# Patient Record
Sex: FEMALE | Race: WHITE | NOT HISPANIC OR LATINO | Employment: FULL TIME | URBAN - METROPOLITAN AREA
[De-identification: names, ages, dates, MRNs, and addresses within clinical notes are randomized per-mention and may not be internally consistent; named-entity substitution may affect disease eponyms.]

---

## 2024-06-19 ENCOUNTER — OFFICE VISIT (OUTPATIENT)
Dept: INTERNAL MEDICINE | Facility: CLINIC | Age: 24
End: 2024-06-19
Payer: MEDICAID

## 2024-06-19 VITALS
SYSTOLIC BLOOD PRESSURE: 110 MMHG | WEIGHT: 132 LBS | HEART RATE: 61 BPM | RESPIRATION RATE: 18 BRPM | BODY MASS INDEX: 21.21 KG/M2 | DIASTOLIC BLOOD PRESSURE: 69 MMHG | HEIGHT: 66 IN | TEMPERATURE: 98 F

## 2024-06-19 DIAGNOSIS — F32.A ANXIETY AND DEPRESSION: Primary | ICD-10-CM

## 2024-06-19 DIAGNOSIS — F43.10 PTSD (POST-TRAUMATIC STRESS DISORDER): ICD-10-CM

## 2024-06-19 DIAGNOSIS — R51.9 NONINTRACTABLE HEADACHE, UNSPECIFIED CHRONICITY PATTERN, UNSPECIFIED HEADACHE TYPE: ICD-10-CM

## 2024-06-19 DIAGNOSIS — A63.0 HPV (HUMAN PAPILLOMA VIRUS) ANOGENITAL INFECTION: ICD-10-CM

## 2024-06-19 DIAGNOSIS — Z13.6 SCREENING FOR HYPERTENSION: ICD-10-CM

## 2024-06-19 DIAGNOSIS — F41.9 ANXIETY AND DEPRESSION: Primary | ICD-10-CM

## 2024-06-19 DIAGNOSIS — L60.0 INGROWN TOENAIL: ICD-10-CM

## 2024-06-19 DIAGNOSIS — Z00.00 WELL ADULT EXAM: ICD-10-CM

## 2024-06-19 PROBLEM — R53.83 FATIGUE: Status: ACTIVE | Noted: 2024-06-19

## 2024-06-19 PROCEDURE — 99205 OFFICE O/P NEW HI 60 MIN: CPT | Mod: PBBFAC | Performed by: NURSE PRACTITIONER

## 2024-06-19 PROCEDURE — 1160F RVW MEDS BY RX/DR IN RCRD: CPT | Mod: CPTII,,, | Performed by: NURSE PRACTITIONER

## 2024-06-19 PROCEDURE — 99205 OFFICE O/P NEW HI 60 MIN: CPT | Mod: S$PBB,,, | Performed by: NURSE PRACTITIONER

## 2024-06-19 PROCEDURE — 1159F MED LIST DOCD IN RCRD: CPT | Mod: CPTII,,, | Performed by: NURSE PRACTITIONER

## 2024-06-19 PROCEDURE — 3074F SYST BP LT 130 MM HG: CPT | Mod: CPTII,,, | Performed by: NURSE PRACTITIONER

## 2024-06-19 PROCEDURE — 3078F DIAST BP <80 MM HG: CPT | Mod: CPTII,,, | Performed by: NURSE PRACTITIONER

## 2024-06-19 PROCEDURE — 3008F BODY MASS INDEX DOCD: CPT | Mod: CPTII,,, | Performed by: NURSE PRACTITIONER

## 2024-06-19 RX ORDER — BUPROPION HYDROCHLORIDE 300 MG/1
300 TABLET ORAL DAILY
COMMUNITY

## 2024-06-19 RX ORDER — BUSPIRONE HYDROCHLORIDE 10 MG/1
10 TABLET ORAL 3 TIMES DAILY
COMMUNITY

## 2024-06-19 RX ORDER — BUTALBITAL, ACETAMINOPHEN AND CAFFEINE 50; 325; 40 MG/1; MG/1; MG/1
1 TABLET ORAL EVERY 4 HOURS PRN
Qty: 30 TABLET | Refills: 3 | Status: SHIPPED | OUTPATIENT
Start: 2024-06-19 | End: 2024-07-19

## 2024-06-19 RX ORDER — DESVENLAFAXINE SUCCINATE 50 MG/1
50 TABLET, EXTENDED RELEASE ORAL DAILY
COMMUNITY

## 2024-06-19 NOTE — PROGRESS NOTES
Patient ID: [id number]     Chief Complaint: Establish Care        HPI:     NAMITA Mark is a 23 y.o. female here today to establish care. Pt has hx of Anxiety/Depression, Fatigue, HA, PTSD, HPV. Pt followed by Dr Attila Estrada for mental health issues.         Immunizations:   Immunization History   Administered Date(s) Administered    COVID-19, MRNA, LN-S, PF (MODERNA FULL 0.5 ML DOSE) 08/12/2022        No past medical history on file.     History reviewed. No pertinent surgical history.    Review of patient's allergies indicates:  No Known Allergies    Current Outpatient Medications   Medication Instructions    buPROPion (WELLBUTRIN XL) 300 mg, Oral, Daily    busPIRone (BUSPAR) 10 mg, Oral, 3 times daily    butalbital-acetaminophen-caffeine -40 mg (FIORICET, ESGIC) -40 mg per tablet 1 tablet, Oral, Every 4 hours PRN    desvenlafaxine succinate (PRISTIQ) 50 mg, Oral, Daily    ibuprofen 800 mg, Mouth/Throat, Daily       Social History     Socioeconomic History    Marital status: Single   Tobacco Use    Smoking status: Never    Smokeless tobacco: Never     Social Determinants of Health     Financial Resource Strain: Medium Risk (6/19/2024)    Overall Financial Resource Strain (CARDIA)     Difficulty of Paying Living Expenses: Somewhat hard   Food Insecurity: Food Insecurity Present (6/19/2024)    Hunger Vital Sign     Worried About Running Out of Food in the Last Year: Often true     Ran Out of Food in the Last Year: Often true   Transportation Needs: No Transportation Needs (9/13/2023)    Received from [facility] within [facility] Subsidiaries and Affiliates    [facility]     Lack of Transportation (Medical): No     Lack of Transportation (Non-Medical): No   Physical Activity: Sufficiently Active (6/19/2024)    Exercise Vital Sign     Days of Exercise per Week: 7 days     Minutes of Exercise per Session: 60 min   Stress: Stress Concern Present  (6/19/2024)    [employer] - Occupational Stress Questionnaire     Feeling of Stress : Rather much   Housing Stability: High Risk (6/19/2024)    Housing Stability Vital Sign     Unable to Pay for Housing in the Last Year: Yes        Family History   Problem Relation Name Age of Onset    Diabetes Mother      Bipolar disorder Mother      Arthritis Maternal Grandmother          Patient Care Team:  Brittaney Esteban FNP as PCP - General (Family Medicine)     Subjective:     Review of Systems     See HPI for details    Constitutional: Denies Change in appetite. Denies Chills. Denies Fever. Denies Night sweats.  Eye: Denies Blurred vision. Denies Discharge. Denies Eye pain.  ENT: Denies Decreased hearing. Denies Sore throat. Denies Swollen glands.  Respiratory: Denies Cough. Denies Shortness of breath. Denies Shortness of breath with exertion. Denies Wheezing.  Cardiovascular: DeniesChest pain at rest. Denies Chest pain with exertion. Denies Irregular heartbeat. Denies Palpitations. Denies Edema.  Gastrointestinal: Denies Abdominal pain. DeniesDiarrhea. Denies Nausea. Denies Vomiting. Denies Hematemesis or Hematochezia.  Genitourinary: Denies Dysuria. Denies Urinary frequency. Denies Urinary urgency. Denies Blood in urine. Admits genital warts  Endocrine: Denies Cold intolerance. Denies Excessive thirst. Denies Heat intolerance. Denies Weight loss. Denies Weight gain.  Musculoskeletal: Denies Painful joints. Denies Weakness.  Integumentary: Denies Rash. Denies Itching. Denies Dry skin. Admits Bilat ingrown toenails.  Neurologic: Denies Dizziness. Denies Fainting. Denies Headache.  Psychiatric: Denies Depression. Denies Anxiety. Denies Suicidal/Homicidal ideations.    All Other ROS: Negative except as stated in HPI.       Objective:     Visit Vitals  /69 (BP Location: Right arm, Patient Position: Sitting, BP Method: Medium (Automatic))   Pulse 61   Temp 98.4 °F (36.9 °C) (Oral)   Resp  "18   Ht 5' 6" (1.676 m)   Wt 59.9 kg (132 lb)   BMI 21.31 kg/m²       Physical Exam    General: Alert and oriented, No acute distress.  Head: Normocephalic, Atraumatic.  Eye: Pupils are equal, round and reactive to light, Extraocular movements are intact, Sclera non-icteric.  Ears/Nose/Throat: Normal, Mucosa moist,Clear.  Neck/Thyroid: Supple, Non-tender, No carotid bruit, No lymphadenopathy, No JVD, Full range of motion.  Respiratory: Clear to auscultation bilaterally; No wheezes, rales or rhonchi,Non-labored respirations, Symmetrical chest wall expansion.  Cardiovascular: Regular rate and rhythm, S1/S2 normal, No murmurs, rubs or gallops.  Gastrointestinal: Soft, Non-tender, Non-distended, Normal bowel sounds, No palpable organomegaly.  Musculoskeletal: Normal range of motion.  Integumentary: + slight erythema noted to bilat inner great toes.   Extremities: No clubbing, cyanosis or edema  Neurologic: No focal deficits, Cranial Nerves II-XII are grossly intact, Motor strength normal upper and lower extremities, Sensory exam intact.  Psychiatric: Normal interaction, Coherent speech, Euthymic mood, Appropriate affect       Labs Reviewed:     Chemistry:  No results found for: "NA", "K", "CHLORIDE", "BUN", "CREATININE", "EGFRNORACEVR", "GLUCOSE", "CALCIUM", "ALKPHOS", "LABPROT", "ALBUMIN", "BILIDIR", "IBILI", "AST", "ALT", "MG", "PHOS", "[id number]"     No results found for: "HGBA1C", "MICROALBCREA"     Hematology:  No results found for: "WBC", "HGB", "HCT", "PLT"    Lipid Panel:  Lab Results   Component Value Date    CHOL 93 (L) 09/27/2023    HDL 66 09/27/2023    TRIG 30 09/27/2023        Urine:  No results found for: "COLORUA", "APPEARANCEUA", "SGUA", "PHUA", "PROTEINUA", "GLUCOSEUA", "KETONESUA", "BLOODUA", "NITRITESUA", "LEUKOCYTESUR", "RBCUA", "WBCUA", "BACTERIA", "SQEPUA", "HYALINECASTS", "CREATRANDUR", "PROTEINURINE", "UPROTCREA"     Assessment:       ICD-10-CM ICD-9-CM   1. Anxiety and depression  F41.9 " 300.00    F32.A 311   2. Nonintractable headache, unspecified chronicity pattern, unspecified headache type  R51.9 784.0   3. PTSD (post-traumatic stress disorder)  F43.10 309.81   4. Screening for hypertension  Z13.6 V81.1   5. Well adult exam  Z00.00 V70.0   6. Ingrown toenail  L60.0 703.0   7. HPV (human papilloma virus) anogenital infection  A63.0 079.4        Plan:     1. Anxiety and depression  Denies SI/HI.  Pt followed by Dr Attila Estrada for mental health issues. Keep appts. Cont Buproprion as prescribed per Dr Estrada.     2. Nonintractable headache, unspecified chronicity pattern, unspecified headache type  Pt has tried Ibuprofen and Tylenol with only minimal relief. Will try on Fioricet 1 tab po Q 4-6 hr prn HA.     3. PTSD (post-traumatic stress disorder)  Pt followed by Dr Attila Estrada for mental health issues. Keep appts. Cont Buproprion as prescribed per Dr Estrada.     4. Screening for hypertension  Labs in 3 months.    5. Well adult exam  Labs in 3 months.     6. HPV genital warts  Refer to GYN cl for eval and txmt.     7. Ingrown toenails  Refer to toenail clinic for eval and mgmt.     Follow up in about 3 weeks (around 7/10/2024) for with labs 1 week prior to appt. . In addition to their scheduled follow up, the patient has also been instructed to follow up on as needed basis.     No future appointments.     Brittaney Esteban, CAITLIN

## 2024-06-21 ENCOUNTER — LAB VISIT (OUTPATIENT)
Dept: LAB | Facility: HOSPITAL | Age: 24
End: 2024-06-21
Attending: NURSE PRACTITIONER
Payer: MEDICAID

## 2024-06-21 DIAGNOSIS — Z00.00 WELL ADULT EXAM: ICD-10-CM

## 2024-06-21 DIAGNOSIS — Z13.6 SCREENING FOR HYPERTENSION: ICD-10-CM

## 2024-06-21 LAB
ALBUMIN SERPL-MCNC: 4.4 G/DL (ref 3.5–5)
ALBUMIN/GLOB SERPL: 1.6 RATIO (ref 1.1–2)
ALP SERPL-CCNC: 58 UNIT/L (ref 40–150)
ALT SERPL-CCNC: 20 UNIT/L (ref 0–55)
ANION GAP SERPL CALC-SCNC: 5 MEQ/L
AST SERPL-CCNC: 25 UNIT/L (ref 5–34)
BACTERIA #/AREA URNS AUTO: ABNORMAL /HPF
BASOPHILS # BLD AUTO: 0.12 X10(3)/MCL
BASOPHILS NFR BLD AUTO: 1.8 %
BILIRUB SERPL-MCNC: 0.7 MG/DL
BILIRUB UR QL STRIP.AUTO: NEGATIVE
BUN SERPL-MCNC: 8.1 MG/DL (ref 7–18.7)
CALCIUM SERPL-MCNC: 9.3 MG/DL (ref 8.4–10.2)
CHLORIDE SERPL-SCNC: 105 MMOL/L (ref 98–107)
CHOLEST SERPL-MCNC: 96 MG/DL
CHOLEST/HDLC SERPL: 2 {RATIO} (ref 0–5)
CLARITY UR: CLEAR
CO2 SERPL-SCNC: 26 MMOL/L (ref 22–29)
COLOR UR AUTO: COLORLESS
CREAT SERPL-MCNC: 0.86 MG/DL (ref 0.55–1.02)
CREAT/UREA NIT SERPL: 9
EOSINOPHIL # BLD AUTO: 0.26 X10(3)/MCL (ref 0–0.9)
EOSINOPHIL NFR BLD AUTO: 4 %
ERYTHROCYTE [DISTWIDTH] IN BLOOD BY AUTOMATED COUNT: 11.5 % (ref 11.5–17)
EST. AVERAGE GLUCOSE BLD GHB EST-MCNC: 85.3 MG/DL
GFR SERPLBLD CREATININE-BSD FMLA CKD-EPI: >60 ML/MIN/1.73/M2
GLOBULIN SER-MCNC: 2.7 GM/DL (ref 2.4–3.5)
GLUCOSE SERPL-MCNC: 88 MG/DL (ref 74–100)
GLUCOSE UR QL STRIP: NORMAL
HAV IGM SERPL QL IA: NONREACTIVE
HBA1C MFR BLD: 4.6 %
HBV CORE IGM SERPL QL IA: NONREACTIVE
HBV SURFACE AG SERPL QL IA: NONREACTIVE
HCT VFR BLD AUTO: 41.8 % (ref 37–47)
HCV AB SERPL QL IA: NONREACTIVE
HDLC SERPL-MCNC: 62 MG/DL (ref 35–60)
HGB BLD-MCNC: 14.5 G/DL (ref 12–16)
HGB UR QL STRIP: NEGATIVE
HIV 1+2 AB+HIV1 P24 AG SERPL QL IA: NONREACTIVE
HYALINE CASTS #/AREA URNS LPF: ABNORMAL /LPF
IMM GRANULOCYTES # BLD AUTO: 0.03 X10(3)/MCL (ref 0–0.04)
IMM GRANULOCYTES NFR BLD AUTO: 0.5 %
KETONES UR QL STRIP: NEGATIVE
LDLC SERPL CALC-MCNC: 27 MG/DL (ref 50–140)
LEUKOCYTE ESTERASE UR QL STRIP: NEGATIVE
LYMPHOCYTES # BLD AUTO: 2.63 X10(3)/MCL (ref 0.6–4.6)
LYMPHOCYTES NFR BLD AUTO: 40.5 %
MCH RBC QN AUTO: 33 PG (ref 27–31)
MCHC RBC AUTO-ENTMCNC: 34.7 G/DL (ref 33–36)
MCV RBC AUTO: 95 FL (ref 80–94)
MONOCYTES # BLD AUTO: 0.49 X10(3)/MCL (ref 0.1–1.3)
MONOCYTES NFR BLD AUTO: 7.6 %
NEUTROPHILS # BLD AUTO: 2.96 X10(3)/MCL (ref 2.1–9.2)
NEUTROPHILS NFR BLD AUTO: 45.6 %
NITRITE UR QL STRIP: NEGATIVE
NRBC BLD AUTO-RTO: 0 %
PH UR STRIP: 7 [PH]
PLATELET # BLD AUTO: 316 X10(3)/MCL (ref 130–400)
PMV BLD AUTO: 8.8 FL (ref 7.4–10.4)
POTASSIUM SERPL-SCNC: 4.1 MMOL/L (ref 3.5–5.1)
PROT SERPL-MCNC: 7.1 GM/DL (ref 6.4–8.3)
PROT UR QL STRIP: NEGATIVE
RBC # BLD AUTO: 4.4 X10(6)/MCL (ref 4.2–5.4)
RBC #/AREA URNS AUTO: ABNORMAL /HPF
SODIUM SERPL-SCNC: 136 MMOL/L (ref 136–145)
SP GR UR STRIP.AUTO: ABNORMAL
SQUAMOUS #/AREA URNS LPF: ABNORMAL /HPF
TRIGL SERPL-MCNC: 34 MG/DL (ref 37–140)
TSH SERPL-ACNC: 1.47 UIU/ML (ref 0.35–4.94)
UROBILINOGEN UR STRIP-ACNC: NORMAL
VLDLC SERPL CALC-MCNC: 7 MG/DL
WBC # BLD AUTO: 6.49 X10(3)/MCL (ref 4.5–11.5)
WBC #/AREA URNS AUTO: ABNORMAL /HPF

## 2024-06-21 PROCEDURE — 80074 ACUTE HEPATITIS PANEL: CPT

## 2024-06-21 PROCEDURE — 80061 LIPID PANEL: CPT

## 2024-06-21 PROCEDURE — 80053 COMPREHEN METABOLIC PANEL: CPT

## 2024-06-21 PROCEDURE — 83036 HEMOGLOBIN GLYCOSYLATED A1C: CPT

## 2024-06-21 PROCEDURE — 36415 COLL VENOUS BLD VENIPUNCTURE: CPT

## 2024-06-21 PROCEDURE — 87389 HIV-1 AG W/HIV-1&-2 AB AG IA: CPT

## 2024-06-21 PROCEDURE — 85025 COMPLETE CBC W/AUTO DIFF WBC: CPT

## 2024-06-21 PROCEDURE — 84443 ASSAY THYROID STIM HORMONE: CPT

## 2024-06-21 PROCEDURE — 81001 URINALYSIS AUTO W/SCOPE: CPT

## 2024-07-01 ENCOUNTER — HOSPITAL ENCOUNTER (EMERGENCY)
Facility: HOSPITAL | Age: 24
Discharge: HOME | End: 2024-07-01
Attending: INTERNAL MEDICINE
Payer: MEDICAID

## 2024-07-01 VITALS
TEMPERATURE: 98 F | SYSTOLIC BLOOD PRESSURE: 118 MMHG | OXYGEN SATURATION: 100 % | RESPIRATION RATE: 16 BRPM | WEIGHT: 127.31 LBS | HEIGHT: 66 IN | HEART RATE: 81 BPM | DIASTOLIC BLOOD PRESSURE: 70 MMHG | BODY MASS INDEX: 20.46 KG/M2

## 2024-07-01 DIAGNOSIS — R21 RASH AND NONSPECIFIC SKIN ERUPTION: Primary | ICD-10-CM

## 2024-07-01 LAB
B-HCG UR QL: NEGATIVE
CTP QC/QA: YES

## 2024-07-01 PROCEDURE — 99284 EMERGENCY DEPT VISIT MOD MDM: CPT

## 2024-07-01 PROCEDURE — 81025 URINE PREGNANCY TEST: CPT | Performed by: NURSE PRACTITIONER

## 2024-07-01 RX ORDER — HYDROXYZINE PAMOATE 25 MG/1
25 CAPSULE ORAL EVERY 6 HOURS PRN
Qty: 20 CAPSULE | Refills: 0 | Status: SHIPPED | OUTPATIENT
Start: 2024-07-01

## 2024-07-01 RX ORDER — PREDNISONE 20 MG/1
40 TABLET ORAL DAILY
Qty: 10 TABLET | Refills: 0 | Status: SHIPPED | OUTPATIENT
Start: 2024-07-01 | End: 2024-07-06

## 2024-07-01 NOTE — Clinical Note
"Sherron Castellanos" Deedee was seen and treated in our emergency department on 7/1/2024.  She may return to work on 07/06/2024.       If you have any questions or concerns, please don't hesitate to call.      Tejas MARSH    "

## 2024-07-01 NOTE — ED PROVIDER NOTES
Encounter Date: 7/1/2024       History     Chief Complaint   Patient presents with    Rash     Presents from home with c/o generalized rash on arms, face, chest, abd, back, and starting to spread onto legs since last night. Red spotty rash noted. No c/o itching, burning, or pain from rah. No reported change in lotions, detergents, or medications.     The patient presents with rash.  The onset was 1 day ago.  The course/duration of symptoms is constant.  Location: generalized. The character of symptoms is mild itching.  Radiating symptom: none.  The degree of symptoms is minimal.  Risk factors consist of none  Prior episodes: none.  Associated symptoms: denies fever, denies chills and denies shortness of breath.             Review of patient's allergies indicates:  No Known Allergies  History reviewed. No pertinent past medical history.  History reviewed. No pertinent surgical history.  Family History   Problem Relation Name Age of Onset    Diabetes Mother      Bipolar disorder Mother      Arthritis Maternal Grandmother       Social History     Tobacco Use    Smoking status: Never    Smokeless tobacco: Never   Substance Use Topics    Alcohol use: Yes     Comment: occasionally    Drug use: Yes     Types: Marijuana     Review of Systems   Constitutional:  Negative for fever.   HENT:  Negative for sore throat.    Respiratory:  Negative for shortness of breath.    Cardiovascular:  Negative for chest pain.   Gastrointestinal:  Negative for nausea.   Genitourinary:  Negative for dysuria.   Musculoskeletal:  Negative for back pain.   Skin:  Positive for rash.   Neurological:  Negative for weakness.   Hematological:  Does not bruise/bleed easily.   All other systems reviewed and are negative.      Physical Exam     Initial Vitals [07/01/24 0753]   BP Pulse Resp Temp SpO2   119/68 80 16 98.4 °F (36.9 °C) 98 %      MAP       --         Physical Exam    Nursing note and vitals reviewed.  Constitutional: She appears  well-developed and well-nourished.   HENT:   Head: Normocephalic and atraumatic.   Right Ear: Tympanic membrane normal.   Left Ear: Tympanic membrane normal.   Nose: Nose normal.   Mouth/Throat: Uvula is midline, oropharynx is clear and moist and mucous membranes are normal.   Neck: Neck supple.   Normal range of motion.  Cardiovascular:  Normal rate, regular rhythm, normal heart sounds and intact distal pulses.           Pulmonary/Chest: Effort normal and breath sounds normal. She has no decreased breath sounds.   Abdominal: Abdomen is soft and flat. Bowel sounds are normal. There is no abdominal tenderness.   Musculoskeletal:         General: Normal range of motion.      Cervical back: Normal range of motion and neck supple.     Neurological: She is alert. She has normal strength.   Skin: Skin is warm and dry.   Generalized maculopapular rash   Psychiatric: She has a normal mood and affect.         ED Course   Procedures  Labs Reviewed   POCT URINE PREGNANCY          Imaging Results    None          Medications - No data to display  Medical Decision Making  The patient presents with rash.  The onset was 1 day ago.  The course/duration of symptoms is constant.  Location: generalized. The character of symptoms is mild itching.  Radiating symptom: none.  The degree of symptoms is minimal.  Risk factors consist of none  Prior episodes: none.  Associated symptoms: denies fever, denies chills and denies shortness of breath.       7:58 AM DISPOSITION: The patient is resting comfortably in no acute distress.  She is hemodynamically stable and is without objective evidence for acute process requiring urgent intervention or hospitalization. I provided counseling to patient with regard to condition, the treatment plan, specific conditions for return, and the importance of follow up. Detailed written and verbal instructions provided to patient and she expressed a verbal understanding of the discharge instructions and overall  management plan. Reiterated the importance of medication administration and safety and advised patient to follow up with primary care provider in 3-5 days or sooner if needed.  Answered questions at this time. The patient is stable for discharge.       Amount and/or Complexity of Data Reviewed  Labs: ordered.      Additional MDM:   Differential Diagnosis:   At this time differential diagnosis is but not limited to contact dermatitis, eczema, tinea              ED Course as of 07/01/24 0758   Mon Jul 01, 2024   0756 Given strict ED return precautions. I have spoken with the patient and/or caregivers. I have explained the patient's condition, diagnoses and treatment plan based on the information available to me at this time. I have answered the patient's and/or caregiver's questions and addressed any concerns. The patient and/or caregivers have as good an understanding of the patient's diagnosis, condition and treatment plan as can be expected at this point. The vital signs have been stable. The patient's condition is stable and appropriate for discharge from the emergency department.      The patient will pursue further outpatient evaluation with the primary care physician or other designated or consulting physician as outlined in the discharge instructions. The patient and/or caregivers are agreeable to this plan of care and follow-up instructions have been explained in detail. The patient and/or caregivers have received these instructions in written format and have expressed an understanding of the discharge instructions. The patient and/or caregivers are aware that any significant change in condition or worsening of symptoms should prompt an immediate return to this or the closest emergency department or a call to 911.      [RB]      ED Course User Index  [RB] Kristian Reese ACNP                           Clinical Impression:  Final diagnoses:  [R21] Rash and nonspecific skin eruption (Primary)          ED  Disposition Condition    Discharge Stable          ED Prescriptions       Medication Sig Dispense Start Date End Date Auth. Provider    hydrOXYzine pamoate (VISTARIL) 25 MG Cap Take 1 capsule (25 mg total) by mouth every 6 (six) hours as needed (itching). May cause drowsiness 20 capsule 7/1/2024 -- Kristian Reese ACNP    predniSONE (DELTASONE) 20 MG tablet Take 2 tablets (40 mg total) by mouth once daily. for 5 days 10 tablet 7/1/2024 7/6/2024 Kristian Reese ACNP          Follow-up Information       Follow up With Specialties Details Why Contact Info    Brittaney Esteban, FNP Family Medicine In 3 days  [address] [facility] [address]  [phone]      [facility] Emergency Medicine  If symptoms worsen [address] [facility] [address] [phone]             Kristian Reese ACNP  07/01/24 0754

## 2024-07-15 ENCOUNTER — OFFICE VISIT (OUTPATIENT)
Dept: FAMILY MEDICINE | Facility: CLINIC | Age: 24
End: 2024-07-15
Payer: MEDICAID

## 2024-07-15 VITALS
RESPIRATION RATE: 18 BRPM | SYSTOLIC BLOOD PRESSURE: 113 MMHG | DIASTOLIC BLOOD PRESSURE: 69 MMHG | BODY MASS INDEX: 20.95 KG/M2 | HEART RATE: 70 BPM | WEIGHT: 130.38 LBS | HEIGHT: 66 IN | OXYGEN SATURATION: 99 % | TEMPERATURE: 99 F

## 2024-07-15 DIAGNOSIS — L60.0 INGROWN TOENAIL: ICD-10-CM

## 2024-07-15 PROCEDURE — 99213 OFFICE O/P EST LOW 20 MIN: CPT | Mod: PBBFAC

## 2024-07-15 RX ORDER — LIDOCAINE HYDROCHLORIDE 10 MG/ML
10 INJECTION, SOLUTION EPIDURAL; INFILTRATION; INTRACAUDAL; PERINEURAL
Status: ACTIVE | OUTPATIENT
Start: 2024-07-15

## 2024-07-15 NOTE — PROGRESS NOTES
I have seen the patient, reviewed the resident's history and physical, assessment, plan, and progress note. I have personally interviewed and examined the patient at bedside and: agree with the findings.     Hima Garcia MD  [facility]

## 2024-07-15 NOTE — PROGRESS NOTES
"[facility] Note    CHIEF COMPLAINT:  Ingrown toenail      HISTORY OF  PRESENT ILLNESS:  Sherron Mark is a 23 y.o. female who presents to  [facility] as a referral for ingrown toenails. Patient states she's had issues with both great toenails for almost 2 years now. She is usually on her feet all day as she works at a bakery and a nursery. They are painful and grow into her skin. The R great toe will sometimes become inflamed and drain a pus-like fluid. She has never been on any antibiotics or had any other treatments in the past. Denies any recent fever, bleeding, drainage. Would like to discuss treatment options today.      REVIEW OF SYSTEMS:  Per HPI.    PHYSICAL EXAMINATION:  Blood pressure 113/69, pulse 70, temperature 98.6 °F (37 °C), resp. rate 18, height 5' 5.75" (1.67 m), weight 59.1 kg (130 lb 6.4 oz), last menstrual period 07/08/2024, SpO2 99%.    General:  VSS. No acute distress.   Skin: Both great toenail beds are grown into the skin (see photo below).          ASSESSMENT/PLAN:  Ingrown toenails  - Discussed options for removal today  - Patient would like to defer removal to a Friday when she will be off from work  - F/u in 2 weeks on a Friday     Elif Winkler MD    [facility] Family Medicine Resident, HO-1        "

## 2024-07-15 NOTE — LETTER
July 15, 2024    Sherron Mark  [address] [place] [address]             [facility]  Family Medicine  [address] [address]  Phone: [phone]   July 15, 2024     Patient: Sherron Mark   YOB: 2000   Date of Visit: 7/15/2024       To Whom it May Concern:    Sherron Mark was seen in my clinic on 7/15/2024. She may return to work on [id number] .    Please excuse her from any classes or work missed.    If you have any questions or concerns, please don't hesitate to call.    Sincerely,         Mando Luke LPN

## 2024-07-17 ENCOUNTER — TELEPHONE (OUTPATIENT)
Dept: INTERNAL MEDICINE | Facility: CLINIC | Age: 24
End: 2024-07-17

## 2024-07-17 ENCOUNTER — OFFICE VISIT (OUTPATIENT)
Dept: INTERNAL MEDICINE | Facility: CLINIC | Age: 24
End: 2024-07-17
Payer: MEDICAID

## 2024-07-17 VITALS
HEART RATE: 65 BPM | RESPIRATION RATE: 18 BRPM | HEIGHT: 65 IN | WEIGHT: 130 LBS | SYSTOLIC BLOOD PRESSURE: 101 MMHG | BODY MASS INDEX: 21.66 KG/M2 | TEMPERATURE: 99 F | DIASTOLIC BLOOD PRESSURE: 67 MMHG

## 2024-07-17 DIAGNOSIS — Z00.00 WELL ADULT EXAM: ICD-10-CM

## 2024-07-17 DIAGNOSIS — F41.9 ANXIETY AND DEPRESSION: Primary | ICD-10-CM

## 2024-07-17 DIAGNOSIS — F43.10 PTSD (POST-TRAUMATIC STRESS DISORDER): ICD-10-CM

## 2024-07-17 DIAGNOSIS — F41.9 ANXIETY: ICD-10-CM

## 2024-07-17 DIAGNOSIS — R51.9 NONINTRACTABLE HEADACHE, UNSPECIFIED CHRONICITY PATTERN, UNSPECIFIED HEADACHE TYPE: ICD-10-CM

## 2024-07-17 DIAGNOSIS — F32.A ANXIETY AND DEPRESSION: Primary | ICD-10-CM

## 2024-07-17 DIAGNOSIS — Z13.6 SCREENING FOR HYPERTENSION: ICD-10-CM

## 2024-07-17 PROCEDURE — 99214 OFFICE O/P EST MOD 30 MIN: CPT | Mod: S$PBB,,, | Performed by: NURSE PRACTITIONER

## 2024-07-17 PROCEDURE — 1160F RVW MEDS BY RX/DR IN RCRD: CPT | Mod: CPTII,,, | Performed by: NURSE PRACTITIONER

## 2024-07-17 PROCEDURE — 3078F DIAST BP <80 MM HG: CPT | Mod: CPTII,,, | Performed by: NURSE PRACTITIONER

## 2024-07-17 PROCEDURE — 99214 OFFICE O/P EST MOD 30 MIN: CPT | Mod: PBBFAC | Performed by: NURSE PRACTITIONER

## 2024-07-17 PROCEDURE — 3074F SYST BP LT 130 MM HG: CPT | Mod: CPTII,,, | Performed by: NURSE PRACTITIONER

## 2024-07-17 PROCEDURE — 3008F BODY MASS INDEX DOCD: CPT | Mod: CPTII,,, | Performed by: NURSE PRACTITIONER

## 2024-07-17 PROCEDURE — 1159F MED LIST DOCD IN RCRD: CPT | Mod: CPTII,,, | Performed by: NURSE PRACTITIONER

## 2024-07-17 PROCEDURE — 3044F HG A1C LEVEL LT 7.0%: CPT | Mod: CPTII,,, | Performed by: NURSE PRACTITIONER

## 2024-07-17 RX ORDER — ALPRAZOLAM 0.5 MG/1
0.5 TABLET ORAL ONCE
Qty: 1 TABLET | Refills: 0 | Status: SHIPPED | OUTPATIENT
Start: 2024-07-17 | End: 2024-07-17

## 2024-07-17 RX ORDER — TOPIRAMATE 25 MG/1
25 TABLET ORAL 2 TIMES DAILY
Qty: 60 TABLET | Refills: 3 | Status: SHIPPED | OUTPATIENT
Start: 2024-07-17 | End: 2025-07-17

## 2024-07-17 NOTE — PROGRESS NOTES
Patient ID: [id number]     Chief Complaint: Results        HPI:     NAMITA Mark is a 23 y.o. female here today for a follow up.         Optometrist:  Dentist:  Breast Cancer Screening:  [unfilled]   Cervical Cancer Screening:     Colorectal Cancer Screening:  Diabetic Eye Exam:  Diabetic Foot Exam:  Lung Cancer Screening:    Prostate Cancer Screening:  AAA Screening:  Osteoporosis Screening:  Medicare Wellness:   Immunizations:   Immunization History   Administered Date(s) Administered    COVID-19, MRNA, LN-S, PF (MODERNA FULL 0.5 ML DOSE) 08/12/2022        -------------------------------------    Abnormal Pap smear of cervix        History reviewed. No pertinent surgical history.    Review of patient's allergies indicates:  No Known Allergies    Current Outpatient Medications   Medication Instructions    buPROPion (WELLBUTRIN XL) 300 mg, Oral, Daily    busPIRone (BUSPAR) 10 mg, Oral, 3 times daily    butalbital-acetaminophen-caffeine -40 mg (FIORICET, ESGIC) -40 mg per tablet 1 tablet, Oral, Every 4 hours PRN    desvenlafaxine succinate (PRISTIQ) 50 mg, Oral, Daily    hydrOXYzine pamoate (VISTARIL) 25 mg, Oral, Every 6 hours PRN, May cause drowsiness    ibuprofen 800 mg, Mouth/Throat, Daily       Social History     Socioeconomic History    Marital status: Single   Tobacco Use    Smoking status: Never    Smokeless tobacco: Never   Substance and Sexual Activity    Alcohol use: Yes     Comment: occasionally    Drug use: Yes     Types: Marijuana    Sexual activity: Yes     Partners: Male     Social Determinants of Health     Financial Resource Strain: Medium Risk (6/19/2024)    Overall Financial Resource Strain (CARDIA)     Difficulty of Paying Living Expenses: Somewhat hard   Food Insecurity: Food Insecurity Present (6/19/2024)    Hunger Vital Sign     Worried About Running Out of Food in the Last Year: Often true     Ran Out of Food in the Last Year: Often true   Transportation  Needs: No Transportation Needs (9/13/2023)    Received from [facility] and Its Subsidiaries and Affiliates    PRAPARE - Transportation     Lack of Transportation (Medical): No     Lack of Transportation (Non-Medical): No   Physical Activity: Sufficiently Active (6/19/2024)    Exercise Vital Sign     Days of Exercise per Week: 7 days     Minutes of Exercise per Session: 60 min   Stress: Stress Concern Present (6/19/2024)    [employer] [place] [employer] - Occupational Stress Questionnaire     Feeling of Stress : Rather much   Housing Stability: High Risk (6/19/2024)    Housing Stability Vital Sign     Unable to Pay for Housing in the Last Year: Yes        Family History   Problem Relation Name Age of Onset    Diabetes Mother      Bipolar disorder Mother      Arthritis Maternal Grandmother          Patient Care Team:  Brittaney Esteban FNP as PCP - General (Family Medicine)     Subjective:     Review of Systems     See HPI for details    Constitutional: Denies Change in appetite. Denies Chills. Denies Fever. Denies Night sweats.  Eye: Denies Blurred vision. Denies Discharge. Denies Eye pain.  ENT: Denies Decreased hearing. Denies Sore throat. Denies Swollen glands.  Respiratory: Denies Cough. Denies Shortness of breath. Denies Shortness of breath with exertion. Denies Wheezing.  Cardiovascular: DeniesChest pain at rest. Denies Chest pain with exertion. Denies Irregular heartbeat. Denies Palpitations. Denies Edema.  Gastrointestinal: Denies Abdominal pain. DeniesDiarrhea. Denies Nausea. Denies Vomiting. Denies Hematemesis or Hematochezia.  Genitourinary: Denies Dysuria. Denies Urinary frequency. Denies Urinary urgency. Denies Blood in urine.  Endocrine: Denies Cold intolerance. Denies Excessive thirst. Denies Heat intolerance. Denies Weight loss. Denies Weight gain.  Musculoskeletal: Denies Painful joints. Denies Weakness.  Integumentary: Denies Rash. Denies Itching.  "Denies Dry skin.  Neurologic: Denies Dizziness. Denies Fainting. AdmitsHeadache.  Psychiatric: Denies Depression. Denies Anxiety. Denies Suicidal/Homicidal ideations.    All Other ROS: Negative except as stated in HPI.       Objective:     Visit Vitals  /67 (BP Location: Right arm, Patient Position: Sitting, BP Method: Large (Automatic))   Pulse 65   Temp 98.8 °F (37.1 °C) (Oral)   Resp 18   Ht 5' 5" (1.651 m)   Wt 59 kg (130 lb)   LMP 07/08/2024   BMI 21.63 kg/m²       Physical Exam    General: Alert and oriented, No acute distress.  Head: Normocephalic, Atraumatic.  Eye: Pupils are equal, round and reactive to light, Extraocular movements are intact, Sclera non-icteric.  Ears/Nose/Throat: Normal, Mucosa moist,Clear.  Neck/Thyroid: Supple, Non-tender, No carotid bruit, No lymphadenopathy, No JVD, Full range of motion.  Respiratory: Clear to auscultation bilaterally; No wheezes, rales or rhonchi,Non-labored respirations, Symmetrical chest wall expansion.  Cardiovascular: Regular rate and rhythm, S1/S2 normal, No murmurs, rubs or gallops.  Gastrointestinal: Soft, Non-tender, Non-distended, Normal bowel sounds, No palpable organomegaly.  Musculoskeletal: Normal range of motion.  Integumentary: Warm, Dry, Intact, No suspicious lesions or rashes.  Extremities: No clubbing, cyanosis or edema  Neurologic: No focal deficits, Cranial Nerves II-XII are grossly intact, Motor strength normal upper and lower extremities, Sensory exam intact.  Psychiatric: Normal interaction, Coherent speech, Euthymic mood, Appropriate affect       Labs Reviewed:     Chemistry:  Lab Results   Component Value Date     06/21/2024    K 4.1 06/21/2024    BUN 8.1 06/21/2024    CREATININE 0.86 06/21/2024    EGFRNORACEVR >60 06/21/2024    GLUCOSE 88 06/21/2024    CALCIUM 9.3 06/21/2024    ALKPHOS 58 06/21/2024    LABPROT 7.1 06/21/2024    ALBUMIN 4.4 06/21/2024    AST 25 06/21/2024    ALT 20 06/21/2024        Lab Results   Component Value " Date    HGBA1C 4.6 06/21/2024        Hematology:  Lab Results   Component Value Date    WBC 6.49 06/21/2024    HGB 14.5 06/21/2024    HCT 41.8 06/21/2024     06/21/2024       Lipid Panel:  Lab Results   Component Value Date    CHOL 96 06/21/2024    CHOL 93 (L) 09/27/2023    HDL 62 (H) 06/21/2024    HDL 66 09/27/2023    LDL 27.00 (L) 06/21/2024    TRIG 34 (L) 06/21/2024    TRIG 30 09/27/2023    TOTALCHOLEST 2 06/21/2024        Urine:  Lab Results   Component Value Date    APPEARANCEUA Clear 06/21/2024    SGUA  06/21/2024      Comment:      1.002      PROTEINUA Negative 06/21/2024    KETONESUA Negative 06/21/2024    LEUKOCYTESUR Negative 06/21/2024    RBCUA None Seen 06/21/2024    WBCUA 0-5 06/21/2024    BACTERIA None Seen 06/21/2024    SQEPUA Trace (A) 06/21/2024    HYALINECASTS None Seen 06/21/2024        Assessment:       ICD-10-CM ICD-9-CM   1. Anxiety and depression  F41.9 300.00    F32.A 311   2. Nonintractable headache, unspecified chronicity pattern, unspecified headache type  R51.9 784.0   3. PTSD (post-traumatic stress disorder)  F43.10 309.81   4. Screening for hypertension  Z13.6 V81.1   5. Well adult exam  Z00.00 V70.0        Plan:     1. Anxiety and depression  Denies SI/HI. Pt followed by Dr Attila Estrada for mental health issues. Keep appts. Cont Buproprion as prescribed per Dr Estrada.  Pt states she is having her toenails removed and is anxious for procedure and asked for RX to take just that day to help with anxiety. RX Xanax 0.25 take 1 tab po 30-45 minutes prior to procedure. Informed to make sure she has someone drive her. Pt verbalized understanding.     2. Nonintractable headache, unspecified chronicity pattern, unspecified headache type  States  Fioricet is not helping control HA. D/C Fioricet. RX Topiramate  25 mg take 1 tab po Q hs X 1 week then increase to 1 tab po BID.     3. PTSD (post-traumatic stress disorder)  Pt followed by Dr Attila Estrada for mental health issues. Keep appts.  Cont Buproprion as prescribed per Dr Estrada.     4. Screening for hypertension  Labs in 6 months.     5. Well adult exam  Labs in 6 months. Pt referred to GYN cl 6-19-24 Keep appt.          Follow up in about 6 months (around 1/17/2025) for with labs 1 week prior to appt. . In addition to their scheduled follow up, the patient has also been instructed to follow up on as needed basis.     Future Appointments   Date Time Provider Department Center   7/19/2024 10:00 AM SCHEDULE,  MINOR SURGERY [facility] RES [place]    10/16/2024  1:30 PM Ruthy Patel FNP [facility] GYN [place]        CAITLIN Langley

## 2024-07-19 ENCOUNTER — OFFICE VISIT (OUTPATIENT)
Dept: FAMILY MEDICINE | Facility: CLINIC | Age: 24
End: 2024-07-19
Payer: MEDICAID

## 2024-07-19 VITALS
WEIGHT: 130 LBS | BODY MASS INDEX: 21.66 KG/M2 | RESPIRATION RATE: 18 BRPM | HEIGHT: 65 IN | OXYGEN SATURATION: 99 % | TEMPERATURE: 99 F

## 2024-07-19 DIAGNOSIS — L60.0 INGROWN TOENAIL: Primary | ICD-10-CM

## 2024-07-19 PROCEDURE — 11730 AVULSION NAIL PLATE SIMPLE 1: CPT | Mod: PBBFAC

## 2024-07-19 PROCEDURE — 99213 OFFICE O/P EST LOW 20 MIN: CPT | Mod: PBBFAC,25

## 2024-07-19 RX ORDER — LIDOCAINE HYDROCHLORIDE 10 MG/ML
10 INJECTION, SOLUTION EPIDURAL; INFILTRATION; INTRACAUDAL; PERINEURAL
Status: COMPLETED | OUTPATIENT
Start: 2024-07-19 | End: 2024-07-19

## 2024-07-19 RX ADMIN — LIDOCAINE HYDROCHLORIDE 100 MG: 10 INJECTION, SOLUTION EPIDURAL; INFILTRATION; INTRACAUDAL; PERINEURAL at 11:07

## 2024-07-19 NOTE — PROGRESS NOTES
"[facility] Minor Procedure Clinic Note    CHIEF COMPLAINT:  Ingrown toe nail          HISTORY OF  PRESENT ILLNESS:  Sherron Mark is a 23 y.o. female who presents to  Minor Procedure Clinic for toenail removal. Was evaluated at last clinic visit but rescheduled for removal today due to patient's work schedule. Patient endorses she would like to proceed with removal today.      REVIEW OF SYSTEMS:  Per HPI.    PHYSICAL EXAMINATION:  Temperature 98.6 °F (37 °C), resp. rate 18, height 5' 4.96" (1.65 m), weight 59 kg (130 lb), last menstrual period 07/08/2024, SpO2 99%.    General:  VSS. No acute distress.   Skin:  Both great toenail beds are grown into the skin (see photo below).           ASSESSMENT/PLAN:  Ingrown toe nail   - R great toenail bed removed today, see procedure note below   - Post-care instructions given   - F/u in one month for recheck of R great toe and removal of L great toenail bed     PROCEDURE NOTE:  Procedure: Removal of R great toenail   Performed by: Elif Winkler MD  Supervised by: Hima Garcia MD   Consent: signed consent obtained after discussion of risks, benefits, and alternative treatments. Time out completed  Description: Prepped with Cloraprep, anesthesia with lidocaine 1%.  Periosteal elevator used to avulse nailbed.  Hemostats use to remove nail.  Dressed with petroleum jelly gauze and Koban.    EBL < 5 ml  Routine post-toenail removal instructions given   The patient tolerated the procedure well with no complications.       Elif Winkler MD    [facility] Family Medicine Resident, HO-1    "

## 2024-07-19 NOTE — LETTER
July 19, 2024    Sherron Mark  [address] [address]             [facility]  Family Medicine  [address] [address]  Phone: [phone]   July 19, 2024     Patient: Sherron Mark   YOB: 2000   Date of Visit: 7/19/2024       To Whom it May Concern:    Sherron Mark was seen in my clinic on 7/19/2024. She may return to work on [id number] .    Please excuse her from any classes or work missed.    If you have any questions or concerns, please don't hesitate to call.    Sincerely,         Mando Luke LPN

## 2024-08-30 ENCOUNTER — OFFICE VISIT (OUTPATIENT)
Dept: INTERNAL MEDICINE | Facility: CLINIC | Age: 24
End: 2024-08-30
Payer: MEDICAID

## 2024-08-30 ENCOUNTER — PATIENT MESSAGE (OUTPATIENT)
Dept: INTERNAL MEDICINE | Facility: CLINIC | Age: 24
End: 2024-08-30

## 2024-08-30 VITALS
BODY MASS INDEX: 22.02 KG/M2 | RESPIRATION RATE: 18 BRPM | HEART RATE: 92 BPM | HEIGHT: 64 IN | SYSTOLIC BLOOD PRESSURE: 115 MMHG | TEMPERATURE: 99 F | WEIGHT: 129 LBS | DIASTOLIC BLOOD PRESSURE: 72 MMHG

## 2024-08-30 DIAGNOSIS — R51.9 NONINTRACTABLE HEADACHE, UNSPECIFIED CHRONICITY PATTERN, UNSPECIFIED HEADACHE TYPE: Primary | ICD-10-CM

## 2024-08-30 PROCEDURE — 99214 OFFICE O/P EST MOD 30 MIN: CPT | Mod: PBBFAC | Performed by: NURSE PRACTITIONER

## 2024-08-30 RX ORDER — DESVENLAFAXINE 100 MG/1
100 TABLET, EXTENDED RELEASE ORAL DAILY
COMMUNITY
Start: 2024-08-01

## 2024-08-30 NOTE — PROGRESS NOTES
Patient ID: [id number]     Chief Complaint: Headache (2 - 3 times a week/referral to chiropracter)        HPI:     NAMITA Mark is a 24 y.o. female here today for c/o HA 2-3 times a week.         Immunizations:   Immunization History   Administered Date(s) Administered    COVID-19, MRNA, LN-S, PF (MODERNA FULL 0.5 ML DOSE) 08/12/2022        -------------------------------------    Abnormal Pap smear of cervix        History reviewed. No pertinent surgical history.    Review of patient's allergies indicates:  No Known Allergies    Current Outpatient Medications   Medication Instructions    buPROPion (WELLBUTRIN XL) 300 mg, Oral, Daily    busPIRone (BUSPAR) 10 mg, Oral, 3 times daily    desvenlafaxine succinate (PRISTIQ) 100 mg, Oral, Daily    ibuprofen 800 mg, Mouth/Throat, Daily    topiramate (TOPAMAX) 25 mg, Oral, 2 times daily, Take 1 tab po Q hs X 1 week then increase to 1 tab po BID.       Social History     Socioeconomic History    Marital status: Single   Tobacco Use    Smoking status: Never    Smokeless tobacco: Never   Substance and Sexual Activity    Alcohol use: Yes     Comment: occasionally    Drug use: Yes     Types: Marijuana    Sexual activity: Yes     Partners: Male     Social Determinants of Health     Financial Resource Strain: Medium Risk (6/19/2024)    Overall Financial Resource Strain (CARDIA)     Difficulty of Paying Living Expenses: Somewhat hard   Food Insecurity: Food Insecurity Present (6/19/2024)    Hunger Vital Sign     Worried About Running Out of Food in the Last Year: Often true     Ran Out of Food in the Last Year: Often true   Transportation Needs: No Transportation Needs (9/13/2023)    Received from [facility] Subsidi[facility] and Affiliates    PRAPARE - Transportation     Lack of Transportation (Medical): No     Lack of Transportation (Non-Medical): No   Physical Activity: Sufficiently Active (6/19/2024)    Exercise Vital  Sign     Days of Exercise per Week: 7 days     Minutes of Exercise per Session: 60 min   Stress: Stress Concern Present (6/19/2024)    [employer] [place] [employer] - Occupational Stress Questionnaire     Feeling of Stress : Rather much   Housing Stability: High Risk (6/19/2024)    Housing Stability Vital Sign     Unable to Pay for Housing in the Last Year: Yes        Family History   Problem Relation Name Age of Onset    Diabetes Mother      Bipolar disorder Mother      Arthritis Maternal Grandmother          Patient Care Team:  Brittaney Esteban FNP as PCP - General (Family Medicine)     Subjective:     Review of Systems     See HPI for details    Constitutional: Denies Change in appetite. Denies Chills. Denies Fever. Denies Night sweats.  Eye: Denies Blurred vision. Denies Discharge. Denies Eye pain.  ENT: Denies Decreased hearing. Denies Sore throat. Denies Swollen glands.  Respiratory: Denies Cough. Denies Shortness of breath. Denies Shortness of breath with exertion. Denies Wheezing.  Cardiovascular: DeniesChest pain at rest. Denies Chest pain with exertion. Denies Irregular heartbeat. Denies Palpitations. Denies Edema.  Gastrointestinal: Denies Abdominal pain. DeniesDiarrhea. Denies Nausea. Denies Vomiting. Denies Hematemesis or Hematochezia.  Genitourinary: Denies Dysuria. Denies Urinary frequency. Denies Urinary urgency. Denies Blood in urine.  Endocrine: Denies Cold intolerance. Denies Excessive thirst. Denies Heat intolerance. Denies Weight loss. Denies Weight gain.  Musculoskeletal: Denies Painful joints. Denies Weakness.  Integumentary: Denies Rash. Denies Itching. Denies Dry skin.  Neurologic: Denies Dizziness. Denies Fainting. AdmitsHeadache.  Psychiatric: Denies Depression. Denies Anxiety. Denies Suicidal/Homicidal ideations.    All Other ROS: Negative except as stated in HPI.       Objective:     Visit Vitals  /72 (BP Location: Right arm, Patient Position: Sitting, BP Method:  "Medium (Automatic))   Pulse 92   Temp 98.7 °F (37.1 °C) (Oral)   Resp 18   Ht 5' 4" (1.626 m)   Wt 58.5 kg (129 lb)   BMI 22.14 kg/m²       Physical Exam    General: Alert and oriented, No acute distress.  Head: Normocephalic, Atraumatic.  Eye: Pupils are equal, round and reactive to light, Extraocular movements are intact, Sclera non-icteric.  Ears/Nose/Throat: Normal, Mucosa moist,Clear.  Neck/Thyroid: Supple, Non-tender, No carotid bruit, No lymphadenopathy, No JVD, Full range of motion.  Respiratory: Clear to auscultation bilaterally; No wheezes, rales or rhonchi,Non-labored respirations, Symmetrical chest wall expansion.  Cardiovascular: Regular rate and rhythm, S1/S2 normal, No murmurs, rubs or gallops.  Gastrointestinal: Soft, Non-tender, Non-distended, Normal bowel sounds, No palpable organomegaly.  Musculoskeletal: Normal range of motion. NO spasms noted to posterior cervical spine bilat.   Integumentary: Warm, Dry, Intact, No suspicious lesions or rashes.  Extremities: No clubbing, cyanosis or edema  Neurologic: No focal deficits, Cranial Nerves II-XII are grossly intact, Motor strength normal upper and lower extremities, Sensory exam intact.  Psychiatric: Normal interaction, Coherent speech, Euthymic mood, Appropriate affect       Labs Reviewed:     Chemistry:  Lab Results   Component Value Date     06/21/2024    K 4.1 06/21/2024    BUN 8.1 06/21/2024    CREATININE 0.86 06/21/2024    EGFRNORACEVR >60 06/21/2024    GLUCOSE 88 06/21/2024    CALCIUM 9.3 06/21/2024    ALKPHOS 58 06/21/2024    LABPROT 7.1 06/21/2024    ALBUMIN 4.4 06/21/2024    AST 25 06/21/2024    ALT 20 06/21/2024        Lab Results   Component Value Date    HGBA1C 4.6 06/21/2024        Hematology:  Lab Results   Component Value Date    WBC 6.49 06/21/2024    HGB 14.5 06/21/2024    HCT 41.8 06/21/2024     06/21/2024       Lipid Panel:  Lab Results   Component Value Date    CHOL 96 06/21/2024    CHOL 93 (L) 09/27/2023    HDL 62 " (H) 06/21/2024    HDL 66 09/27/2023    LDL 27.00 (L) 06/21/2024    TRIG 34 (L) 06/21/2024    TRIG 30 09/27/2023    TOTALCHOLEST 2 06/21/2024        Urine:  Lab Results   Component Value Date    APPEARANCEUA Clear 06/21/2024    SGUA  06/21/2024      Comment:      1.002      PROTEINUA Negative 06/21/2024    KETONESUA Negative 06/21/2024    LEUKOCYTESUR Negative 06/21/2024    RBCUA None Seen 06/21/2024    WBCUA 0-5 06/21/2024    BACTERIA None Seen 06/21/2024    SQEPUA Trace (A) 06/21/2024    HYALINECASTS None Seen 06/21/2024        Assessment:       ICD-10-CM ICD-9-CM   1. Nonintractable headache, unspecified chronicity pattern, unspecified headache type  R51.9 784.0        Plan:     1. Nonintractable headache, unspecified chronicity pattern, unspecified headache type  Pt states she is still taking Topamax for Has and so far it it is helping but is requesting a referral to a Chiropractor for further eval and mgmt. Informed pt to call her insurance and find out which Chiropractor takes her insurance and let me know and will send a referral. Pt verbalized understanding. Pt states she sometimes has an aura before Has and sometimes she doesn't. Cont Topamax as prescribed.          Follow up for Keep f/u appt 10-17-24. . In addition to their scheduled follow up, the patient has also been instructed to follow up on as needed basis.     Future Appointments   Date Time Provider Department Center   10/16/2024  1:30 PM Ruthy Patel FNP [facility] GYN [place]   10/17/2024  1:40 PM Brittaney sEteban FNP [facility] [place]        CAITLIN Langley

## 2024-08-30 NOTE — PROGRESS NOTES
Pt request referral to [facility] at [address] [place] [address]. Will send fax for referral as requested for eval and mgmt of HA. Please inform pt referral has been sent.

## 2024-09-23 PROBLEM — Z00.00 WELL ADULT EXAM: Status: RESOLVED | Noted: 2024-06-19 | Resolved: 2024-09-23

## 2024-09-23 PROBLEM — Z13.6 SCREENING FOR HYPERTENSION: Status: RESOLVED | Noted: 2024-06-19 | Resolved: 2024-09-23

## 2024-10-03 ENCOUNTER — PATIENT MESSAGE (OUTPATIENT)
Dept: INTERNAL MEDICINE | Facility: CLINIC | Age: 24
End: 2024-10-03
Payer: MEDICAID

## 2024-10-07 DIAGNOSIS — R51.9 NONINTRACTABLE HEADACHE, UNSPECIFIED CHRONICITY PATTERN, UNSPECIFIED HEADACHE TYPE: ICD-10-CM

## 2024-10-07 DIAGNOSIS — M54.2 NECK PAIN: ICD-10-CM

## 2024-10-07 DIAGNOSIS — M54.2 NECK PAIN: Primary | ICD-10-CM

## 2024-10-07 DIAGNOSIS — R51.9 NONINTRACTABLE HEADACHE, UNSPECIFIED CHRONICITY PATTERN, UNSPECIFIED HEADACHE TYPE: Primary | ICD-10-CM

## 2024-10-07 NOTE — TELEPHONE ENCOUNTER
Pt requesting updated referral to location listed below.      [facility] at the [address] [place] [address]  [phone]

## 2024-10-07 NOTE — TELEPHONE ENCOUNTER
I reordered the PT referral for the [facility] location with [facility].

## 2024-10-16 ENCOUNTER — OFFICE VISIT (OUTPATIENT)
Dept: GYNECOLOGY | Facility: CLINIC | Age: 24
End: 2024-10-16
Payer: MEDICAID

## 2024-10-16 VITALS
WEIGHT: 128.81 LBS | HEART RATE: 74 BPM | RESPIRATION RATE: 18 BRPM | TEMPERATURE: 99 F | HEIGHT: 66 IN | SYSTOLIC BLOOD PRESSURE: 107 MMHG | DIASTOLIC BLOOD PRESSURE: 72 MMHG | OXYGEN SATURATION: 100 % | BODY MASS INDEX: 20.7 KG/M2

## 2024-10-16 DIAGNOSIS — Z11.3 SCREENING FOR STD (SEXUALLY TRANSMITTED DISEASE): ICD-10-CM

## 2024-10-16 DIAGNOSIS — Z30.011 ENCOUNTER FOR INITIAL PRESCRIPTION OF CONTRACEPTIVE PILLS: ICD-10-CM

## 2024-10-16 DIAGNOSIS — Z12.4 ENCOUNTER FOR PAPANICOLAOU SMEAR FOR CERVICAL CANCER SCREENING: Primary | ICD-10-CM

## 2024-10-16 LAB
B-HCG UR QL: NEGATIVE
C TRACH DNA SPEC QL NAA+PROBE: NOT DETECTED
CLUE CELLS VAG QL WET PREP: NORMAL
CTP QC/QA: YES
N GONORRHOEA DNA SPEC QL NAA+PROBE: NOT DETECTED
SOURCE (OHS): NORMAL
T VAGINALIS VAG QL WET PREP: NORMAL
WBC #/AREA VAG WET PREP: NORMAL
YEAST SPEC QL WET PREP: NORMAL

## 2024-10-16 PROCEDURE — 99214 OFFICE O/P EST MOD 30 MIN: CPT | Mod: PBBFAC

## 2024-10-16 PROCEDURE — 87491 CHLMYD TRACH DNA AMP PROBE: CPT

## 2024-10-16 PROCEDURE — 87210 SMEAR WET MOUNT SALINE/INK: CPT

## 2024-10-16 PROCEDURE — 81025 URINE PREGNANCY TEST: CPT | Mod: PBBFAC

## 2024-10-16 PROCEDURE — 87591 N.GONORRHOEAE DNA AMP PROB: CPT

## 2024-10-16 RX ORDER — NORGESTIMATE AND ETHINYL ESTRADIOL 0.25-0.035
1 KIT ORAL DAILY
Qty: 28 TABLET | Refills: 11 | Status: SHIPPED | OUTPATIENT
Start: 2024-10-16 | End: 2025-10-15

## 2024-10-16 NOTE — PROGRESS NOTES
[facility] -  Gynecology / Women's Health Clinic     Subjective:      Patient ID: Sherron Mark is a 24 y.o. female.    Chief Complaint:  Gynecologic Exam      History of Present Illness:  The patient G0 here for annual exam. Her LMP was 9/23/24. Period last 7 days and changes tampons 5x/day. Cycles monthly and manageable. Denies history of abnormal paps. Denies breast or urinary complaints. Denies pelvic pain, abnormal bleeding or discharge. Pt reports hx of genital warts with removal, and desires testing today. HPV vaccine, 3rd dose scheduled for 12/2024 with [facility]. Declines contraception, requesting today. Hx of Depo and patch use. Admits sexually active, last unprotected intercourse 3 weeks ago. Denies tobacco use. Dep. screening 0. Denies fly hx of breast, ovarian, uterine or colon cancer.     GYN & OB History:  Patient's last menstrual period was 09/23/2024 (exact date).     OB History   No obstetric history on file.       Past Medical History:   Diagnosis Date    Abnormal Pap smear of cervix         History reviewed. No pertinent surgical history.     Social History     Tobacco Use    Smoking status: Never    Smokeless tobacco: Never   Substance and Sexual Activity    Alcohol use: Yes     Comment: occasionally    Drug use: Yes     Types: Marijuana    Sexual activity: Yes     Partners: Male        Current Outpatient Medications   Medication Instructions    buPROPion (WELLBUTRIN XL) 300 mg, Daily    busPIRone (BUSPAR) 10 mg, 3 times daily    desvenlafaxine succinate (PRISTIQ) 100 mg, Daily    ibuprofen 800 mg, Mouth/Throat, Daily    norgestimate-ethinyl estradioL (ORTHO-CYCLEN) 0.25-35 mg-mcg per tablet 1 tablet, Oral, Daily, Take at same time everyday.    topiramate (TOPAMAX) 25 mg, Oral, 2 times daily, Take 1 tab po Q hs X 1 week then increase to 1 tab po BID.       Review of patient's allergies indicates:  No Known Allergies      Review of Systems:  Review of  "Systems  Negative except for pertinent findings for positives per HPI.     Objective:     Physical Exam   Visit Vitals  /72 (Patient Position: Sitting)   Pulse 74   Temp 98.7 °F (37.1 °C) (Oral)   Resp 18   Ht 5' 6" (1.676 m)   Wt 58.4 kg (128 lb 12.8 oz)   LMP 09/23/2024 (Exact Date)   SpO2 100%   BMI 20.79 kg/m²       GENERAL: Well-developed female. No acute distress.    SKIN: Normal to inspection, warm and intact.  BREASTS: No rashes or erythema. No masses, lumps, discharge, tenderness.  VULVA: General appearance normal; external genitalia with no lesions or erythema.  VAGINA: Mucosa/vaginal vault pink, no abnormal discharge or lesions.  CERVIX: Pink, nulliparous appearing os, no erythema or abnormal discharge.  BIMANUAL EXAM: reveals a 8 week-sized uterus. The uterus is non tender. Bilateral adnexa reveal no tenderness.  PSYCHIATRIC: Patient is oriented to person, place, and time. Mood and affect are normal.    Assessment:       ICD-10-CM ICD-9-CM   1. Encounter for Papanicolaou smear for cervical cancer screening  Z12.4 V76.2   2. Screening for STD (sexually transmitted disease)  Z11.3 V74.5   3. Encounter for initial prescription of contraceptive pills  Z30.011 V25.01       Plan:     1. Encounter for Papanicolaou smear for cervical cancer screening  -     Liquid-Based Pap Smear, Screening    2. Screening for STD (sexually transmitted disease)  -     Wet Prep, Genital  -     Chlamydia/GC, PCR  -     POCT urine pregnancy    3. Encounter for initial prescription of contraceptive pills  -     norgestimate-ethinyl estradioL (ORTHO-CYCLEN) 0.25-35 mg-mcg per tablet; Take 1 tablet by mouth once daily. Take at same time everyday.  Dispense: 28 tablet; Refill: 11    Pap today  STD testing    Reviewed the risks and benefits of the following contraceptive agents:  Barrier protection (need to use with every use, protection against STDs);  Combined hormonal methods of contraception including pills, patch and ring " with use daily, weekly and monthly use  Progesterone only methods including pills and depo injection (risk of irregular bleeding, possible 5# weight gain in first year with Depo)  LARC including implants (Nexplanon) 3 years of protection against conception (risk of irregular bleeding for 3-6 months) and IUDs (Polina, Kyleena, Mirena and Paragard with their use for 3, 5 and 10 years). Polina, Kyleena and Mirena IUD has progesterone and many patients have decrease in their cycles, some with amenorrhea and may also have irregular bleeding for first 3-6 months. Paragard has no hormones, cycles continue and may be heavier.    UPT (-). Pt admits to sexual activity since LMP. Explained that UPT does not completely r/o pregnancy, pt understands and accepts risks. Pt to check home UPT in 2-3 weeks.  Denies any medical hx; no hx of blood clots; PE, DVT, MI, CVA, pt is a non-smoker. Discussed risk associated with OCP use such as MI, CVA and DVT/PE, pt accepts risk. Will start Sprintec, pt instructed to take at the same time each day and use back up such as condoms for first month of pills. Instructions on when to start and what to do if she misses a pill. Discussed usual side effects and needs for back up birth control. Reminded patient condoms should be used to prevent STDs.   Agreed to OCP use, will order COCs.    Call with any GYN concerns    Follow up in about 1 year (around 10/16/2025) for Annual.

## 2024-11-10 DIAGNOSIS — R51.9 NONINTRACTABLE HEADACHE, UNSPECIFIED CHRONICITY PATTERN, UNSPECIFIED HEADACHE TYPE: ICD-10-CM

## 2024-11-20 RX ORDER — TOPIRAMATE 25 MG/1
TABLET ORAL
Qty: 60 TABLET | Refills: 0 | Status: SHIPPED | OUTPATIENT
Start: 2024-11-20

## 2024-11-29 DIAGNOSIS — R51.9 NONINTRACTABLE HEADACHE, UNSPECIFIED CHRONICITY PATTERN, UNSPECIFIED HEADACHE TYPE: ICD-10-CM

## 2024-11-30 RX ORDER — TOPIRAMATE 25 MG/1
TABLET ORAL
Qty: 60 TABLET | Refills: 0 | Status: SHIPPED | OUTPATIENT
Start: 2024-11-30

## 2024-12-04 ENCOUNTER — OFFICE VISIT (OUTPATIENT)
Dept: INTERNAL MEDICINE | Facility: CLINIC | Age: 24
End: 2024-12-04
Payer: MEDICAID

## 2024-12-04 VITALS
TEMPERATURE: 98 F | DIASTOLIC BLOOD PRESSURE: 70 MMHG | SYSTOLIC BLOOD PRESSURE: 107 MMHG | BODY MASS INDEX: 21.83 KG/M2 | HEIGHT: 66 IN | HEART RATE: 80 BPM | OXYGEN SATURATION: 100 % | WEIGHT: 135.81 LBS | RESPIRATION RATE: 18 BRPM

## 2024-12-04 DIAGNOSIS — Z13.6 SCREENING FOR HYPERTENSION: Primary | ICD-10-CM

## 2024-12-04 DIAGNOSIS — R51.9 NONINTRACTABLE HEADACHE, UNSPECIFIED CHRONICITY PATTERN, UNSPECIFIED HEADACHE TYPE: ICD-10-CM

## 2024-12-04 PROCEDURE — 99214 OFFICE O/P EST MOD 30 MIN: CPT | Mod: PBBFAC | Performed by: NURSE PRACTITIONER

## 2024-12-04 PROCEDURE — 3074F SYST BP LT 130 MM HG: CPT | Mod: CPTII,,, | Performed by: NURSE PRACTITIONER

## 2024-12-04 PROCEDURE — 1160F RVW MEDS BY RX/DR IN RCRD: CPT | Mod: CPTII,,, | Performed by: NURSE PRACTITIONER

## 2024-12-04 PROCEDURE — 99214 OFFICE O/P EST MOD 30 MIN: CPT | Mod: S$PBB,,, | Performed by: NURSE PRACTITIONER

## 2024-12-04 PROCEDURE — 3008F BODY MASS INDEX DOCD: CPT | Mod: CPTII,,, | Performed by: NURSE PRACTITIONER

## 2024-12-04 PROCEDURE — 3044F HG A1C LEVEL LT 7.0%: CPT | Mod: CPTII,,, | Performed by: NURSE PRACTITIONER

## 2024-12-04 PROCEDURE — 3078F DIAST BP <80 MM HG: CPT | Mod: CPTII,,, | Performed by: NURSE PRACTITIONER

## 2024-12-04 PROCEDURE — 1159F MED LIST DOCD IN RCRD: CPT | Mod: CPTII,,, | Performed by: NURSE PRACTITIONER

## 2024-12-04 RX ORDER — TOPIRAMATE 25 MG/1
25 TABLET ORAL 2 TIMES DAILY
Qty: 60 TABLET | Refills: 4 | Status: SHIPPED | OUTPATIENT
Start: 2024-12-04

## 2024-12-04 NOTE — PROGRESS NOTES
Patient ID: [id number]     Chief Complaint: Follow-up        HPI:     NAMITA Mark is a 24 y.o. female here today for a follow up.         Immunizations:   Immunization History   Administered Date(s) Administered    COVID-19, MRNA, LN-S, PF (MODERNA FULL 0.5 ML DOSE) 08/12/2022    COVID-19, mRNA, LNP-S, PF (Moderna) Ages 12+ 09/27/2024    Influenza (FLUBLOK) - Quadrivalent - Recombinant - PF *Preferred* (egg allergy) 09/27/2024        -------------------------------------    Abnormal Pap smear of cervix        History reviewed. No pertinent surgical history.    Review of patient's allergies indicates:  No Known Allergies    Current Outpatient Medications   Medication Instructions    buPROPion (WELLBUTRIN XL) 300 mg, Daily    busPIRone (BUSPAR) 10 mg, 3 times daily    desvenlafaxine succinate (PRISTIQ) 100 mg, Daily    ibuprofen 800 mg, Mouth/Throat, Daily    norgestimate-ethinyl estradioL (ORTHO-CYCLEN) 0.25-35 mg-mcg per tablet 1 tablet, Oral, Daily, Take at same time everyday.    topiramate (TOPAMAX) 25 mg, Oral, 2 times daily       Social History     Socioeconomic History    Marital status: Single   Tobacco Use    Smoking status: Never    Smokeless tobacco: Never   Substance and Sexual Activity    Alcohol use: Yes     Comment: occasionally    Drug use: Yes     Types: Marijuana    Sexual activity: Yes     Partners: Male     Social Drivers of Health     Financial Resource Strain: Low Risk  (12/4/2024)    Overall Financial Resource Strain (CARDIA)     Difficulty of Paying Living Expenses: Not hard at all   Food Insecurity: No Food Insecurity (12/4/2024)    Hunger Vital Sign     Worried About Running Out of Food in the Last Year: Never true     Ran Out of Food in the Last Year: Never true   Transportation Needs: No Transportation Needs (12/4/2024)    TRANSPORTATION NEEDS     Transportation : No   Physical Activity: Insufficiently Active (12/4/2024)    Exercise Vital Sign     Days of Exercise per  Week: 3 days     Minutes of Exercise per Session: 30 min   Stress: Stress Concern Present (6/19/2024)    [employer] - Occupational Stress Questionnaire     Feeling of Stress : Rather much   Housing Stability: Low Risk  (12/4/2024)    Housing Stability Vital Sign     Unable to Pay for Housing in the Last Year: No     Homeless in the Last Year: No        Family History   Problem Relation Name Age of Onset    Diabetes Mother      Bipolar disorder Mother      Arthritis Maternal Grandmother          Patient Care Team:  Brittaney Esteban FNP as PCP - General (Family Medicine)     Subjective:     Review of Systems     See HPI for details    Constitutional: Denies Change in appetite. Denies Chills. Denies Fever. Denies Night sweats.  Eye: Denies Blurred vision. Denies Discharge. Denies Eye pain.  ENT: Denies Decreased hearing. Denies Sore throat. Denies Swollen glands.  Respiratory: Denies Cough. Denies Shortness of breath. Denies Shortness of breath with exertion. Denies Wheezing.  Cardiovascular: DeniesChest pain at rest. Denies Chest pain with exertion. Denies Irregular heartbeat. Denies Palpitations. Denies Edema.  Gastrointestinal: Denies Abdominal pain. DeniesDiarrhea. Denies Nausea. Denies Vomiting. Denies Hematemesis or Hematochezia.  Genitourinary: Denies Dysuria. Denies Urinary frequency. Denies Urinary urgency. Denies Blood in urine.  Endocrine: Denies Cold intolerance. Denies Excessive thirst. Denies Heat intolerance. Denies Weight loss. Denies Weight gain.  Musculoskeletal: Denies Painful joints. Denies Weakness.  Integumentary: Denies Rash. Denies Itching. Denies Dry skin.  Neurologic: Denies Dizziness. Denies Fainting. Denies Headache.  Psychiatric: Denies Depression. Denies Anxiety. Denies Suicidal/Homicidal ideations.    All Other ROS: Negative except as stated in HPI.       Objective:     Visit Vitals  /70 (BP Location: Left arm, Patient Position: Sitting)   Pulse 80  "  Temp 98.3 °F (36.8 °C) (Oral)   Resp 18   Ht 5' 6" (1.676 m)   Wt 61.6 kg (135 lb 12.8 oz)   SpO2 100%   BMI 21.92 kg/m²       Physical Exam    General: Alert and oriented, No acute distress.  Head: Normocephalic, Atraumatic.  Eye: Pupils are equal, round and reactive to light, Extraocular movements are intact, Sclera non-icteric.  Ears/Nose/Throat: Normal, Mucosa moist,Clear.  Neck/Thyroid: Supple, Non-tender, No carotid bruit, No lymphadenopathy, No JVD, Full range of motion.  Respiratory: Clear to auscultation bilaterally; No wheezes, rales or rhonchi,Non-labored respirations, Symmetrical chest wall expansion.  Cardiovascular: Regular rate and rhythm, S1/S2 normal, No murmurs, rubs or gallops.  Gastrointestinal: Soft, Non-tender, Non-distended, Normal bowel sounds, No palpable organomegaly.  Musculoskeletal: Normal range of motion.  Integumentary: Warm, Dry, Intact, No suspicious lesions or rashes.  Extremities: No clubbing, cyanosis or edema  Neurologic: No focal deficits, Cranial Nerves II-XII are grossly intact, Motor strength normal upper and lower extremities, Sensory exam intact.  Psychiatric: Normal interaction, Coherent speech, Euthymic mood, Appropriate affect       Labs Reviewed:     Chemistry:  Lab Results   Component Value Date     06/21/2024    K 4.1 06/21/2024    BUN 8.1 06/21/2024    CREATININE 0.86 06/21/2024    EGFRNORACEVR >60 06/21/2024    GLUCOSE 88 06/21/2024    CALCIUM 9.3 06/21/2024    ALKPHOS 58 06/21/2024    LABPROT 7.1 06/21/2024    ALBUMIN 4.4 06/21/2024    AST 25 06/21/2024    ALT 20 06/21/2024        Lab Results   Component Value Date    HGBA1C 4.6 06/21/2024        Hematology:  Lab Results   Component Value Date    WBC 6.49 06/21/2024    HGB 14.5 06/21/2024    HCT 41.8 06/21/2024     06/21/2024       Lipid Panel:  Lab Results   Component Value Date    CHOL 96 06/21/2024    CHOL 93 (L) 09/27/2023    HDL 62 (H) 06/21/2024    HDL 66 09/27/2023    LDL 27.00 (L) 06/21/2024 "    TRIG 34 (L) 06/21/2024    TRIG 30 09/27/2023    TOTALCHOLEST 2 06/21/2024        Urine:  Lab Results   Component Value Date    APPEARANCEUA Clear 06/21/2024    SGUA  06/21/2024      Comment:      1.002      PROTEINUA Negative 06/21/2024    KETONESUA Negative 06/21/2024    LEUKOCYTESUR Negative 06/21/2024    RBCUA None Seen 06/21/2024    WBCUA 0-5 06/21/2024    BACTERIA None Seen 06/21/2024    SQEPUA Trace (A) 06/21/2024    HYALINECASTS None Seen 06/21/2024        Assessment:       ICD-10-CM ICD-9-CM   1. Screening for hypertension  Z13.6 V81.1   2. Nonintractable headache, unspecified chronicity pattern, unspecified headache type  R51.9 784.0        Plan:     1. Screening for hypertension (Primary)  Labs in 3 months.     2. HA  Pt states she has seen improvement with Topiramate and PT for HA. Refilled Topiramate as prescribed.          Follow up in about 3 months (around 3/4/2025) for with labs 1 week prior to appt. . In addition to their scheduled follow up, the patient has also been instructed to follow up on as needed basis.     Future Appointments   Date Time Provider Department Center   10/20/2025  1:10 PM Ruthy Patel FNP [facility] G[facility] [place]